# Patient Record
Sex: FEMALE | Race: WHITE | NOT HISPANIC OR LATINO
[De-identification: names, ages, dates, MRNs, and addresses within clinical notes are randomized per-mention and may not be internally consistent; named-entity substitution may affect disease eponyms.]

---

## 2017-01-22 ENCOUNTER — TRANSCRIPTION ENCOUNTER (OUTPATIENT)
Age: 47
End: 2017-01-22

## 2018-05-24 ENCOUNTER — EMERGENCY (EMERGENCY)
Facility: HOSPITAL | Age: 48
LOS: 1 days | End: 2018-05-24
Payer: COMMERCIAL

## 2018-05-24 PROCEDURE — 71045 X-RAY EXAM CHEST 1 VIEW: CPT | Mod: 26

## 2018-05-24 PROCEDURE — 70450 CT HEAD/BRAIN W/O DYE: CPT | Mod: 26

## 2018-05-24 PROCEDURE — 99285 EMERGENCY DEPT VISIT HI MDM: CPT

## 2020-02-18 ENCOUNTER — OUTPATIENT (OUTPATIENT)
Dept: OUTPATIENT SERVICES | Facility: HOSPITAL | Age: 50
LOS: 1 days | End: 2020-02-18

## 2021-07-06 ENCOUNTER — TRANSCRIPTION ENCOUNTER (OUTPATIENT)
Age: 51
End: 2021-07-06

## 2023-05-11 ENCOUNTER — APPOINTMENT (OUTPATIENT)
Dept: UROGYNECOLOGY | Facility: CLINIC | Age: 53
End: 2023-05-11
Payer: COMMERCIAL

## 2023-05-11 VITALS — SYSTOLIC BLOOD PRESSURE: 128 MMHG | DIASTOLIC BLOOD PRESSURE: 82 MMHG

## 2023-05-11 DIAGNOSIS — N89.8 OTHER SPECIFIED NONINFLAMMATORY DISORDERS OF VAGINA: ICD-10-CM

## 2023-05-11 DIAGNOSIS — R82.998 OTHER ABNORMAL FINDINGS IN URINE: ICD-10-CM

## 2023-05-11 PROBLEM — Z00.00 ENCOUNTER FOR PREVENTIVE HEALTH EXAMINATION: Status: ACTIVE | Noted: 2023-05-11

## 2023-05-11 PROCEDURE — 99203 OFFICE O/P NEW LOW 30 MIN: CPT

## 2023-05-11 NOTE — ASSESSMENT
[FreeTextEntry1] : Patient is a 53-year-old multipara with intermittent sensation of passage of bubbles from the urethra and the vagina.  She has no symptoms of passage of flatus per vagina or abnormal vaginal discharge concerning for rectal vaginal fistula.  She has no risk factors for vesicovaginal fistula.

## 2023-05-11 NOTE — HISTORY OF PRESENT ILLNESS
[FreeTextEntry1] : Patient is a 53 years old P2 ( x1,  x 1) who is referred by Dr.. Rai for evaluation and management of bubbling sensation in the vagina x 2 weeks\par Patient reports passage of little bubbles  in urethra/ vagina after she returned from recent trip. \par She also reports foamy urine\par She reports increased urine frequency for few days. Urine culture was negative\par No hx of kidney stone\par She denies leaking urine.  \par Denies new onset abdominal pain.  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 Ramos in . Denies constipation/ diarrhea.  She denies noticing any  perianal discharge.  Denies passage of gas or abnormal discharge from vagina\par Daily fluid include: Lemon water, home made juice, seltzer 1 can, 1 glass of wine per night\par She started taking a new vitamin supplement\par \par GYN history: LMP 2022. She saw her GYN yesterday and had pelvic ultrasound which was normal.  She denies postmenopausal bleeding or spotting.  Denies history of abnormal mammograms.\par \par Past surgical history:  [place]1 [place] [facility] [place]/f[place]ula repair in  at [facility].

## 2023-05-11 NOTE — PHYSICAL EXAM
[Chaperone Present] : A chaperone was present in the examining room during all aspects of the physical examination [FreeTextEntry1] : General: Not in acute distress, alert and oriented x3.\par Neck: Supple. No lymphadenopathy. \par Abdomen: Soft, nontender, and nondistended. No obvious hepatosplenomegaly. No obvious hernias. \par Pelvic Exam: Normal external female genitalia.Loss of pubic hair.  Saddle sensory exam S2 to S4 is intact. Perineal reflexes not visualized. Urethra is hypermobile without prolapse, exudates, or lesions. Cough stress test is negative.   Pale and mildly atrophic-appearing vaginal epithelium. No vaginal blood or discharge. Redundant tissue and skin tag noted along the distal lateral vaginal walls bilaterally.  No granulation tissue no signs of fistula in the vaginal canal.  Vaginal walls are patulous and parous.  Cervix without abnormal lesions. Bimanual exam reveals a small uterus in normal positioning. No adnexal masses or tenderness. Levator ani contraction is 2/5.  All vaginal compartments are well supported

## 2023-05-11 NOTE — DISCUSSION/SUMMARY
[FreeTextEntry1] : I discussed the examination findings with [place].  Explained to her the exam finding is not concerning for the fistula however we can further evaluate with imaging.  She would like to think about undergoing imaging.  She had a recent pelvic ultrasound which was normal.  She also reports undergoing CT scan in 2021 which was normal per her report.\par I discussed the possibility of an trapping in the vaginal walls which will pass out in certain positions.\par We discussed management of constipation to avoid avoid straining.  Advised her to start taking a fiber supplement to improve bowel consistency and regularity.\par Urine culture was sent to exclude urinary tract infection\par Counseled her regarding avoidance of bladder irritants that could also alter the urine pH and consistency.\par Follow-up in 3 months or as needed..  I advised her to call me if her symptoms persist for more than a month.  I will order abdominal imaging.  She is comfortable with this plan

## 2023-05-12 LAB
APPEARANCE: CLEAR
BACTERIA: NEGATIVE /HPF
BILIRUBIN URINE: NEGATIVE
BLOOD URINE: NEGATIVE
CAST: 0 /LPF
COLOR: YELLOW
EPITHELIAL CELLS: 3 /HPF
GLUCOSE QUALITATIVE U: NEGATIVE MG/DL
KETONES URINE: NEGATIVE MG/DL
LEUKOCYTE ESTERASE URINE: ABNORMAL
MICROSCOPIC-UA: NORMAL
NITRITE URINE: NEGATIVE
PH URINE: 6.5
PROTEIN URINE: NEGATIVE MG/DL
RED BLOOD CELLS URINE: 0 /HPF
SPECIFIC GRAVITY URINE: 1.01
UROBILINOGEN URINE: 0.2 MG/DL
WHITE BLOOD CELLS URINE: 1 /HPF

## 2023-05-15 LAB — BACTERIA UR CULT: NORMAL
